# Patient Record
Sex: FEMALE | NOT HISPANIC OR LATINO | Employment: OTHER | URBAN - METROPOLITAN AREA
[De-identification: names, ages, dates, MRNs, and addresses within clinical notes are randomized per-mention and may not be internally consistent; named-entity substitution may affect disease eponyms.]

---

## 2022-01-31 ENCOUNTER — MEDICAL CORRESPONDENCE (OUTPATIENT)
Dept: HEALTH INFORMATION MANAGEMENT | Facility: CLINIC | Age: 67
End: 2022-01-31
Payer: COMMERCIAL

## 2022-03-02 ENCOUNTER — HOSPITAL ENCOUNTER (OUTPATIENT)
Dept: OCCUPATIONAL THERAPY | Facility: REHABILITATION | Age: 67
End: 2022-03-02
Payer: COMMERCIAL

## 2022-03-02 DIAGNOSIS — Z78.9 DECREASED ACTIVITIES OF DAILY LIVING (ADL): ICD-10-CM

## 2022-03-02 DIAGNOSIS — R29.898 RIGHT HAND WEAKNESS: ICD-10-CM

## 2022-03-02 DIAGNOSIS — R27.9 LACK OF COORDINATION: Primary | ICD-10-CM

## 2022-03-02 PROCEDURE — 97165 OT EVAL LOW COMPLEX 30 MIN: CPT | Mod: GO | Performed by: OCCUPATIONAL THERAPIST

## 2022-03-02 NOTE — PROGRESS NOTES
[facility]          OUTPATIENT OCCUPATIONAL THERAPY  EVALUATION  PLAN OF TREATMENT FOR OUTPATIENT REHABILITATION  (COMPLETE FOR INITIAL CLAIMS ONLY)  Patient's Last Name, First Name, M.I.  YOB: 1955  Francy Monahan                        Provider's Name  [facility] Medical Record No.  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                          OT Diagnosis:     lack of coordination in right UE, right UE weakness, decreased IADL's Visits from SOC:  1   _________________________________________________________________________________  Plan of Treatment/Functional Goals:  Coordination training, Neuromuscular re-education, Strengthening      Goals  Goal Description: Patient will be able to write with less difficulty utilizing weighted pen and hand weight  Target Date: 05/31/22     Goal Description: Patient will be able to cut fabric with less difficulty using a spring action scissors  Target Date: 05/31/22     Goal Description: Patient will be able to perform meal prep with less difficulty  Target Date: 05/31/22    Therapy Frequency: once a week     Predicted Duration of Therapy Intervention (days/wks): 12 weeks  Sanjuana Rodriguez OT          I CERTIFY THE NEED FOR THESE SERVICES FURNISHED UNDER        THIS PLAN OF TREATMENT AND WHILE UNDER MY CARE .             Physician Signature               Date    X_____________________________________________________                Certification date from: 03/02/22, Certification date to: 05/31/22            Referring Physician: Dr. Jourdan Cutler     Initial Assessment        See Epic Evaluation      Start Of Care Date: 03/02/22 03/02/22 0900    Quick Adds   Quick Adds Certification   Type of Visit Initial Outpatient Occupational Therapy Evaluation   General Information   Start Of Care Date 03/02/22   Referring Physician Dr. 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 Probable Hamilton tremor and dysmetria. Patient was initially sent to [facility] before going to [facility] acute rehab for 2 months. She then had home health for about a month. She then had outpatient OT/PT and ST until December 2021 when she had progress plateau and was discharged from outpatient. Patient then saw neurologist at [facility] and was referred for additional outpatient OT.   Role/Living Environment   Role/Living Environment Comments Patient lives alone in 2 story walkout style home. She was previously independent with all ADL and IADL's   Pain   Patient currently in pain No   Fall Risk Screen   Fall screen completed by OT   Have you fallen 2 or more times in the past year? No   Have you fallen and had an injury in the past year? No   Is patient a fall risk? No   Abuse Screen (yes response referral indicated)   Feels Unsafe at Home or Work/School no   Feels Threatened by Someone no   Does Anyone Try to Keep You From Having Contact with Others or Doing Things Outside Your Home? no   Physical Signs of Abuse Present no   Patient needs abuse support services and resources No   Cognitive Status Examination   Orientation Orientation to person, place and time   Level of Consciousness Alert   Follows Commands and Answers Questions 100% of the time   Visual Perception   Visual Perception Comments Patient  reports no deficits   Sensation   Sensation Comments Patient reports numbness in left hand at night that is not new   Range of Motion (ROM)   ROM Comments Bilateral UE AROM is WNL   MMT: Shoulder   Shoulder Flexion - Right Side (4/5) good, right   Shoulder Extension - Right Side (4/5) good, right   MMT: Elbow/Forearm   Elbow Flexion - Right Side (5/5) normal,right   Elbow Extension - Right Side (5/5) normal,right   Hand Strength   Hand Dominance Right   Left Hand  (pounds) 64 pounds   Right Hand  (pounds) 61 pounds   Left Lateral Pinch (pounds) 17 pounds   Right Lateral Pinch (pounds) 16 pounds   Left Three Point Pinch (pounds) 11 pounds   Right Three Point Pinch (pounds) 11 pounds   Coordination   Coordination Comments Patient has a tremor in right hand since stroke. She has poor motor control and planning   Transfer Skill   Level of Deuel: Transfers independent   Bathing   Level of Deuel - Bathing independent   Upper Body Dressing   Level of Deuel: Dress Upper Body independent   Lower Body Dressing   Level of Deuel: Dress Lower Body independent   Toileting   Level of Deuel: Toilet independent   Grooming   Level of Deuel: Grooming independent   Eating/Self-Feeding   Level of Deuel: Eating independent   Instrumental Activities of Daily Living Assessment   IADL Assessment/Observations Patient is independent with cooking, cleaning and laundry with slight difficulty with right hand coordination. She manages her own bills and medications. Patient is not driving yet.    Planned Therapy Interventions   Planned Therapy Interventions Coordination training;Neuromuscular re-education;Strengthening    OT Goal 1   Goal Description Patient will be able to write with less difficulty utilizing weighted pen and hand weight   Target Date 05/31/22    OT Goal 2   Goal Description Patient will be able to cut fabric with less difficulty using a spring action scissors   Target Date  05/31/22    OT Goal 3   Goal Description Patient will be able to perform meal prep with less difficulty   Target Date 05/31/22   Clinical Impression   Criteria for Skilled Therapeutic Interventions Met Yes, treatment indicated   OT Diagnosis lack of coordination in right UE, right UE weakness, decreased IADL's   Clinical Decision Making (Complexity) Low complexity   Therapy Frequency once a week   Predicted Duration of Therapy Intervention (days/wks) 12 weeks   Risks and Benefits of Treatment have been explained. Yes   Patient, Family & other staff in agreement with plan of care Yes   Clinical Impression Comments Patient had a stroke with right sided weakness and incoordination. She would benefit from OT for progression of existing HEP   Education Assessment   Barriers To Learning No Barriers   Therapy Certification   Certification date from 03/02/22   Certification date to 05/31/22   Certification I certify the need for these services furnished under this plan of treatment and while under my care.  (Physician co-signature of this document indicates review and certification of the therapy plan)   Total Evaluation Time   OT Eval, Low Complexity Minutes (42651) 45

## 2022-03-14 ENCOUNTER — HOSPITAL ENCOUNTER (OUTPATIENT)
Dept: OCCUPATIONAL THERAPY | Facility: REHABILITATION | Age: 67
Discharge: HOME | End: 2022-03-14
Payer: COMMERCIAL

## 2022-03-14 DIAGNOSIS — R27.9 LACK OF COORDINATION: Primary | ICD-10-CM

## 2022-03-14 DIAGNOSIS — Z78.9 DECREASED ACTIVITIES OF DAILY LIVING (ADL): ICD-10-CM

## 2022-03-14 DIAGNOSIS — R29.898 RIGHT HAND WEAKNESS: ICD-10-CM

## 2022-03-14 PROCEDURE — 97112 NEUROMUSCULAR REEDUCATION: CPT | Mod: GO | Performed by: OCCUPATIONAL THERAPIST

## 2022-03-18 ENCOUNTER — HOSPITAL ENCOUNTER (OUTPATIENT)
Dept: OCCUPATIONAL THERAPY | Facility: REHABILITATION | Age: 67
Discharge: HOME | End: 2022-03-18
Payer: COMMERCIAL

## 2022-03-18 DIAGNOSIS — R29.898 RIGHT HAND WEAKNESS: ICD-10-CM

## 2022-03-18 DIAGNOSIS — R27.9 LACK OF COORDINATION: Primary | ICD-10-CM

## 2022-03-18 DIAGNOSIS — Z78.9 DECREASED ACTIVITIES OF DAILY LIVING (ADL): ICD-10-CM

## 2022-03-18 PROCEDURE — 97112 NEUROMUSCULAR REEDUCATION: CPT | Mod: GO | Performed by: OCCUPATIONAL THERAPIST

## 2022-04-13 ENCOUNTER — HOSPITAL ENCOUNTER (OUTPATIENT)
Dept: OCCUPATIONAL THERAPY | Facility: REHABILITATION | Age: 67
Discharge: HOME | End: 2022-04-13
Payer: COMMERCIAL

## 2022-04-13 DIAGNOSIS — R27.9 LACK OF COORDINATION: Primary | ICD-10-CM

## 2022-04-13 DIAGNOSIS — R29.898 RIGHT HAND WEAKNESS: ICD-10-CM

## 2022-04-13 DIAGNOSIS — Z78.9 DECREASED ACTIVITIES OF DAILY LIVING (ADL): ICD-10-CM

## 2022-04-13 PROCEDURE — 97112 NEUROMUSCULAR REEDUCATION: CPT | Mod: GO | Performed by: OCCUPATIONAL THERAPIST

## 2022-04-20 ENCOUNTER — HOSPITAL ENCOUNTER (OUTPATIENT)
Dept: OCCUPATIONAL THERAPY | Facility: REHABILITATION | Age: 67
Discharge: HOME | End: 2022-04-20
Payer: COMMERCIAL

## 2022-04-20 DIAGNOSIS — R29.898 RIGHT HAND WEAKNESS: ICD-10-CM

## 2022-04-20 DIAGNOSIS — R27.9 LACK OF COORDINATION: Primary | ICD-10-CM

## 2022-04-20 DIAGNOSIS — Z78.9 DECREASED ACTIVITIES OF DAILY LIVING (ADL): ICD-10-CM

## 2022-04-20 PROCEDURE — 97112 NEUROMUSCULAR REEDUCATION: CPT | Mod: GO | Performed by: OCCUPATIONAL THERAPIST

## 2022-04-20 PROCEDURE — 97535 SELF CARE MNGMENT TRAINING: CPT | Mod: GO | Performed by: OCCUPATIONAL THERAPIST

## 2022-04-27 ENCOUNTER — HOSPITAL ENCOUNTER (OUTPATIENT)
Dept: OCCUPATIONAL THERAPY | Facility: REHABILITATION | Age: 67
Discharge: HOME | End: 2022-04-27
Payer: COMMERCIAL

## 2022-04-27 DIAGNOSIS — R29.898 RIGHT HAND WEAKNESS: ICD-10-CM

## 2022-04-27 DIAGNOSIS — R27.9 LACK OF COORDINATION: Primary | ICD-10-CM

## 2022-04-27 DIAGNOSIS — Z78.9 DECREASED ACTIVITIES OF DAILY LIVING (ADL): ICD-10-CM

## 2022-04-27 PROCEDURE — 97112 NEUROMUSCULAR REEDUCATION: CPT | Mod: GO | Performed by: OCCUPATIONAL THERAPIST

## 2022-05-18 ENCOUNTER — HOSPITAL ENCOUNTER (OUTPATIENT)
Dept: OCCUPATIONAL THERAPY | Facility: REHABILITATION | Age: 67
Discharge: HOME | End: 2022-05-18
Payer: COMMERCIAL

## 2022-05-18 DIAGNOSIS — Z78.9 DECREASED ACTIVITIES OF DAILY LIVING (ADL): ICD-10-CM

## 2022-05-18 DIAGNOSIS — R27.9 LACK OF COORDINATION: Primary | ICD-10-CM

## 2022-05-18 DIAGNOSIS — R29.898 RIGHT HAND WEAKNESS: ICD-10-CM

## 2022-05-18 PROCEDURE — 97535 SELF CARE MNGMENT TRAINING: CPT | Mod: GO | Performed by: OCCUPATIONAL THERAPIST

## 2022-06-15 ENCOUNTER — HOSPITAL ENCOUNTER (OUTPATIENT)
Dept: OCCUPATIONAL THERAPY | Facility: REHABILITATION | Age: 67
Discharge: HOME | End: 2022-06-15
Payer: COMMERCIAL

## 2022-06-15 DIAGNOSIS — Z78.9 DECREASED ACTIVITIES OF DAILY LIVING (ADL): ICD-10-CM

## 2022-06-15 DIAGNOSIS — R29.898 RIGHT HAND WEAKNESS: ICD-10-CM

## 2022-06-15 DIAGNOSIS — R27.9 LACK OF COORDINATION: Primary | ICD-10-CM

## 2022-06-15 PROCEDURE — 97535 SELF CARE MNGMENT TRAINING: CPT | Mod: GO | Performed by: OCCUPATIONAL THERAPIST

## 2022-06-15 NOTE — PROGRESS NOTES
[facility]    Outpatient Occupational Therapy Progress Note and Discharge Note  Patient: Francy Monahan  : 1955    Beginning/End Dates of Reporting Period:  3-2-22 to 6-15-22    Referring Provider: Dr. Jourdan Cutler    Therapy Diagnosis: lack of coordination in right UE, right UE weakness, decreased IADL's    Objective Measurements:   Objective Measure: Right shoulder flexion/extension   Details: 4+/4+ (same as previous of 4+/4+)   Objective Measure: Right elbow flexion/extension   Details: 5/5   Objective Measure: Right POG   Details: 64# (decreased from 70#)   Objective Measure: Right 3 point pinch   Details: 11# (decreased from 12#)   Objective Measure: Right lateral pinch   Details: 15# (same as previous of 15#)       Goals:   Goal Identifier     Goal Description Patient will be able to write with less difficulty utilizing weighted pen and hand weight   Target Date 22   Date Met      Progress (detail required for progress note): Patient reports that writing continues to improve     Goal Identifier     Goal Description Patient will be able to cut fabric with less difficulty using a spring action scissors   Target Date 22   Date Met  22   Progress (detail required for progress note):       Goal Identifier     Goal Description Patient will be able to perform meal prep with less difficulty   Target Date 22   Date Met  06/15/22   Progress (detail required for progress note):       Plan:  Discharge from therapy.